# Patient Record
Sex: FEMALE
[De-identification: names, ages, dates, MRNs, and addresses within clinical notes are randomized per-mention and may not be internally consistent; named-entity substitution may affect disease eponyms.]

---

## 2020-06-05 NOTE — XMS REPORT
Summary of Care: 16 - 16

                             Created on: 2043



LUZMARIA HARDY

External Reference #: [id number]

: 1990

Sex: Female



Demographics





                          Address                   [address]

[place]  [address]-

 

                          Home Phone                [phone]

 

                          Preferred Language        English

 

                          Marital Status            Single

 

                          Spiritism Affiliation     None

 

                          Race                      Other

 

                          Ethnic Group              /Latin





Author





                          Author                    [facility]

ospital

 

                          Organization              [facility]

ospi\[facility] [place] [facility]\""

 

                          Address                   Unknown

 

                          Phone                     Unavailable







Encounter





MELISSA Rizzo(EDGARDO) [id number] Date(s): 16 - 16

[facility] [address] [place] [address]-     [phone]
[phone]

Discharge Diagnosis: Laceration of chin

Discharge Diagnosis: Scalp laceration

Discharge Diagnosis: Closed head injury

Discharge Disposition: Home or Self Care

Attending Physician: Margaret Lott DO





Vital Signs





  



                     Most recent to      1                   2



                                         oldest [Reference  



                                         Range]:  

 

  



                           Height                    165.1 cm 



                                         (16 3:37 AM) 

 

  



                     Temperature Oral    98.1 DegF           98.4 DegF



                     [96.4-99.1 DegF]    (16 6:30 AM)  (16 3:37 AM)

 

  



                     Blood Pressure      117/80 mmHg         115/85 mmHg



                     [/60-90 mmHg]  (16 6:30 AM)  (16 3:37 AM)

 

  



                     Respiratory Rate    20 BRMIN            24 BRMIN



                     [14-20 BRMIN]       (16 6:30 AM)  *HI*



                                         (16 3:37 AM)

 

  



                     Peripheral Pulse    99 bpm              132 bpm



                     Rate [ bpm]   (16 6:30 AM)  *HI*



                                         (16 3:37 AM)

 

  



                           Weight                    54.545 kg 



                                         (16 3:37 AM) 

 

  



                           Body Mass Index           20.01 m2 



                                         (16 3:37 AM) 







Problem List





No data available for this section



Allergies, Adverse Reactions, Alerts





   



                 Substance       Reaction        Severity        Status

 

   



                           NKDA                      Active







Medications





ketOROLAC

30 mg, 1 mL, Route: IVP, Drug form: INJ, ONCE, Dosing Weight 54.545, kg, Priorit
y: STAT, Start date: 16 5:23:00 CST, Stop date: 16 5:23:00 CST

Notes: (Same as:Toradol) IV bolus must be given >15 seconds. Give IM 
administration slowly and deeply into the muscle.Not for use > 4 days  *** 
MEDICATION WASTE ***Product Size:  30 mgProduct Wasted:  ___ mg

Start Date: 16

Stop Date: 16

Status: Completed



morphine Sulfate

4 mg, 1 mL, Route: IVP, Drug form: SOLN, ONCE, Dosing Weight 54.545, kg, Priorit
y: STAT, Start date: 16 5:24:00 CST, Stop date: 16 5:24:00 CST

Notes: (Same as:MORPhine Sulfate)

Start Date: 16

Stop Date: 16

Status: Completed



ondansetron

4 mg, 2 mL, Route: IVP, Drug form: INJ, ONCE, Dosing Weight 54.545, kg, Priority
: STAT, Start date: 16 5:24:00 CST, Stop date: 16 5:24:00 CST

Notes: (Same as: Zofran)  *** MEDICATION WASTE ***Product Size:  4 mgProduct Was
milka:  ___ mg

Start Date: 16

Stop Date: 16

Status: Completed



Sodium Chloride 0.9% (Bolus) IV

1,000 mL, 2,000 ml/hr, Infuse Over: 30 minutes, Route: IV, 1,000, Drug form: INJ
, ONCE, Priority: STAT, Dosing Weight 54.545 kg, Start date: 16 3:49:00 CS
T, Duration: 1 doses or times, Stop date: 16 3:49:00 CST

Start Date: 16

Stop Date: 16

Status: Completed



Results





ELECTROLYTES



 



                           Most recent to            1



                                         oldest [Reference 



                                         Range]: 

 

 



                           Sodium Lvl [135-145       143 mEq/L



                           mEq/L]                    (16 3:56 AM)

 

 



                           Potassium Lvl             3.1 mEq/L



                           [3.5-5.1 mEq/L]           *LOW*



                                         (16 3:56 AM)

 

 



                           Chloride Lvl [      107 mEq/L



                           mEq/L]                    (16 3:56 AM)

 

 



                           CO2 [24-32 mEq/L]         26 mEq/L



                                         (16 3:56 AM)

 

 



                           AGAP [10.0-20.0           13.1 mEq/L



                           mEq/L]                    (16 3:56 AM)







CHEM PANEL



 



                           Most recent to            1



                                         oldest [Reference 



                                         Range]: 

 

 



                           Creatinine Lvl            0.72 mg/dL



                           [0.50-1.40 mg/dL]         (16 3:56 AM)

 

 



                           eGFR                      118 mL/min/1.73m2 1



                                         *NA*



                                         (16 3:56 AM)

 

 



                           BUN [7-22 mg/dL]          4 mg/dL



                                         *LOW*



                                         (16 3:56 AM)

 

 



                           B/C Ratio [6-25]          6



                                         (16 3:56 AM)

 

 



                           Glucose Lvl [70-99        113 mg/dL



                           mg/dL]                    *HI*



                                         (16 3:56 AM)

 

 



                           Total Protein             7.4 g/dL



                           [6.4-8.4 g/dL]            (16 3:56 AM)

 

 



                           Albumin Lvl [3.5-5.0      4.2 g/dL



                           g/dL]                     (16 3:56 AM)

 

 



                           Globulin [2.7-4.2         3.2 g/dL



                           g/dL]                     (16 3:56 AM)

 

 



                           A/G Ratio [0.7-1.6]       1.3



                                         (16 3:56 AM)

 

 



                           Calcium Lvl               8.0 mg/dL



                           [8.5-10.5 mg/dL]          *LOW*



                                         (16 3:56 AM)

 

 



                           ALT [0-65 unit/L]         30 unit/L



                                         (16 3:56 AM)

 

 



                           AST [0-37 unit/L]         21 unit/L



                                         (16 3:56 AM)

 

 



                           Alk Phos [          71 unit/L



                           unit/L]                   (16 3:56 AM)

 

 



                           Bili Total [0.2-1.3       0.5 mg/dL



                           mg/dL]                    (16 3:56 AM)







1Result Comment: The eGFR is calculated using the CKD-EPI formula. In most 
young, healthy individuals the eGFR will be >90 mL/min/1.73m2. The eGFR declines
with age. An eGFR of 60-89 may be normal in some populations, particularly the 
elderly, for whom the CKD-EPI formula has not been extensively validated. Use of
the eGFR is not recommended in the following populations:



Individuals with unstable creatinine concentrations, including pregnant patients
and those with serious co-morbid conditions.



Patients with extremes in muscle mass or diet. 



The data above are obtained from the National Kid[employer] Disease Education Program (
NKDEP) which additionally recommends that when the eGFR is used in patients with
extremes of body mass index for purposes of drug dosing, the eGFR should be mul
tiplied by the estimated BMI.



TOXICOLOGY



 



                           Most recent to            1



                                         oldest [Reference 



                                         Range]: 

 

 



                           Etoh (%)                  .118 %



                                         *NA*



                                         (16 3:56 AM)

 

 



                           Ethanol Lvl               118 mg/dL



                                         *NA*



                                         (16 3:56 AM)







ENDOCRINOLOGY



 



                           Most recent to            1



                                         oldest [Reference 



                                         Range]: 

 

 



                           hCG Tot                   <1 mIU/mL



                                         *NA*



                                         (16 3:56 AM)







HEMATOLOGY



 



                           Most recent to            1



                                         oldest [Reference 



                                         Range]: 

 

 



                           WBC [3.7-10.4 K/CMM]      9.6 K/CMM



                                         (16 3:56 AM)

 

 



                           RBC [4.20-5.40            4.49 M/CMM



                           M/CMM]                    (16 3:56 AM)

 

 



                           Hgb [12.0-16.0 g/dL]      14.5 g/dL



                                         (16 3:56 AM)

 

 



                           Hct [36.0-48.0 %]         41.7 %



                                         (16 3:56 AM)

 

 



                           MCV [80.0-98.0 fL]        92.9 fL



                                         (16 3:56 AM)

 

 



                           MCH [27.0-31.0 pg]        32.2 pg



                                         *HI*



                                         (16 3:56 AM)

 

 



                           MCHC [32.0-36.0           34.7 g/dL



                           g/dL]                     (16 3:56 AM)

 

 



                           RDW [11.5-14.5 %]         12.9 %



                                         (16 3:56 AM)

 

 



                           Platelet [133-450         273 K/CMM



                           K/CMM]                    (16 3:56 AM)

 

 



                           MPV [7.4-10.4 fL]         8.4 fL



                                         (16 3:56 AM)

 

 



                           Segs [45.0-75.0 %]        79.0 %



                                         *HI*



                                         (16 3:56 AM)

 

 



                           Lymphocytes               13.5 %



                           [20.0-40.0 %]             *LOW*



                                         (16 3:56 AM)

 

 



                           Monocytes [2.0-12.0       7.3 %



                           %]                        (16 3:56 AM)

 

 



                           Basophils [0.0-1.0        0.2 %



                           %]                        (16 3:56 AM)

 

 



                           Segs-Bands #              7.6 K/CMM



                           [1.5-8.1 K/CMM]           (16 3:56 AM)

 

 



                           Lymphocytes #             1.3 K/CMM



                           [1.0-5.5 K/CMM]           (16 3:56 AM)

 

 



                           Monocytes # [0.0-0.8      0.7 K/CMM



                           K/CMM]                    (16 3:56 AM)

 

 



                           PT [12.0-14.7             13.3 seconds



                           seconds]                  (16 3:56 AM)

 

 



                           INR [0.85-1.17]           0.99



                                         (16 3:56 AM)

 

 



                           PTT [22.9-35.8            28.1 seconds



                           seconds]                  (16 3:56 AM)







Immunizations





No data available for this section



Procedures





   



                 Procedure       Date            Related Diagnosis  Body Site

 

   



                                          section   







Social History





 



                           Social History Type       Response

 

 



                           Smoking Status            Current some day smoker; Ex

posure to Tobacco Smoke None; 

Cigarette Smoking



                                         Last 365 Days Yes; Reg Smoking Cessatio

n Counseling Yes







Assessment and Plan





No data available for this section

## 2020-06-05 NOTE — XMS REPORT
Continuity of Care Document

                             Created on: 2020



LUZMARIA HARDY

External Reference #: [id number]

: [id number]

Sex: Female



Demographics





                          Address                   [address] [place]  [address]

 

                          Home Phone                [phone]

 

                          Preferred Language        English

 

                          Marital Status            Unknown

 

                          Jain Affiliation     Unknown

 

                          Race                      Unknown

 

                                        Additional Race(s) 

Other



Other



 

                          Ethnic Group              /Latin





Author





                          Author                    [facility]

t

 

                          Organization              [facility]

 

                          Address                   [address]

[place]  [address]



 

                          Phone                     Unavailable







Support





                Name            Relationship    Address         Phone

 

                    [address]  [address]                [address] [place]  [address]                      [phone]







Care Team Providers





                    Care Team Member Name Role                Phone

 

                    Jessica CARDENAS, ENRIQUE Avery    PCP                 [phone]

 

                    Porsha Anderson Attphys             [phone] 

 

                    Celso White  Attphys             [phone]

 

                    Saul CARDENAS, RAQUEL Goodwin [facility]             [phone]

 

                    Jessica CARDENAS, ENRIQUE Avery    Attphys             [phone]

 

                    Pedro Lott-Fabien Randall [facility]             [phone]

 

                    Ariel Lott Cat    Attphys             [phone]

 

                    Kal Tafoya Att[facility]             [phone]







Payers





           Payer Name Policy Type Policy Number Effective Date Expiration Date S ource [employer] [facility]

 

[id number] [facility] [id number] 
[address]                xxxxxxxxx    2018 00:00:00              James irwin







Problems





           Condition Name Condition Details Condition Category Status     Onset 

Date Resolution

Date            Last Treatment Date Treating Clinician Comments        Source

 

                          D27.0=BENIGN NEOPLASM OF RIGHT OVARY/N92              

           D27.0=BENIGN 

NEOPLASM OF RIGHT OVARY/N92                        Active                       
2019                                                                      
                          [facility]                     Diagnosis           Ac

tive              

2019 00:00:00              2019 12:30:00                           M

ras Munoz

 

                RAMOS (generalized anxiety disorder) RAMOS (generalized anxiety diso

rder) Disease         

Active     2019 00:00:00                                             [facility]

 

                          THROAT PAIN/COUGHING                              THRO

AT PAIN/COUGHING               

        Active                        2017                                
                                                                [facility]     
               Diagnosis Active  2017 00:00:00         2017 16:14:00

                 

[facility]

 

                          ASSAULT                                           ASSA

ULT                        Active           

            2016                                                          
                                      [facility]                     Diagnosis 

                

Active     2016 00:00:00            2016 04:07:00                   

    [facility]

 

                          ARM PAIN                                          ARM 

PAIN                        Active         

              10/25/2016                                                        
                                        [facility]                     

Diagnosis Active    2016-10-25 00:00:00           2016-11-15 07:58:00           

          [facility]

 

                          Acute upper respiratory infection, unspecified        

                 Acute 

upper respiratory infection, unspecified                                        
       2017     
                                           [facility]                     

Problem             2017 05:00:00 2017 04:47:25 2017 04:47:25 

                    

[facility]

 

                          Pneumonia, unspecified organism                       

  Pneumonia, unspecified 

organism                                                2017                                     
           [facility]                     Problem                          05:00:00 

2017 04:47:25 2017 04:47:25                                 [facility]

 

                          Discharge Diagnosis: Laceration of chin               

          Discharge 

Diagnosis: Laceration of chin                                                
2016            
                                    [facility]                     Problem     

                            

2016 06:00:00 2017 04:11:17 2017 04:11:17                     

      [facility]

 

                          Discharge Diagnosis: Scalp laceration                 

        Discharge 

Diagnosis: Scalp laceration                                                
2016            
                                   [facility]                     Problem     

                            

2016 06:00:00 2017 04:11:17 2017 04:11:17                     

      [facility]

 

                          Discharge Diagnosis: Closed head injury               

          Discharge 

Diagnosis: Closed head injury                                                
2016            
                                    [facility]                     Problem     

                            

2016 06:00:00 2017 04:11:17 2017 04:11:17                     

      [facility]

 

                          Discharge Diagnosis: Contusion of hand                

         Discharge 

Diagnosis: Contusion of hand                                                
10/25/2016                                                10/28/2016            
                                    [facility]                     Problem     

                            

2016-10-25 05:00:00 2016-10-28 04:08:39 2016-10-28 04:08:39                     

      [facility]







Allergies, Adverse Reactions, Alerts





        Allergy Name Allergy Type Status  Severity Reaction(s) Onset Date Inacti

ve Date 

Treating Clinician        Comments                  Source

 

       No Known Medication Allergies No Known Medication Allergies Active       

                                    

[facility]







Family History





           Family Member Diagnosis  Comments   Start Date Stop Date  Source

 

           Maternal grandmother Arthritis                                   [facility]

[facility]

 

           Maternal grandmother Cancer                                      [place]

is [facility]

 

           Natural [facility] Arthritis                                   [facility]

 

           Natural mother Psychiatry                                  [facility]







Social History





           Social Habit Start Date Stop Date  Quantity   Comments   Source

 

           History SDOH Alcohol Std Drinks                                      

       [facility] SDOH Alcohol Binge                                           

  [facility]

 

           Sex Assigned At Birth                                             [facility]

 

           Alcohol intake 2020 00:00:00 2020 00:00:00               

        [facility] SDOH Alcohol Frequency 2019 00:00:00 2019 00:00:0

0 1                     

[facility]







                Smoking Status  Start Date      Stop Date       Source

 

                Former smoker   2020 00:00:00 2020 00:00:00 [place] [facility]

ealt

 

                Social History  2017 21:47:08                 [facility]







Medications





             Ordered Medication Name Filled Medication Name Start Date   Stop Da

te    Current 

Medication? Ordering Clinician Indication Dosage     Frequency  Signature (SIG) 

Comments                  Components                Source

 

           FLUoxetine (PROZAC) 10 mg capsule            2020 00:00:00 2020 23:59:00 No          

                Generalized anxiety disorder                                 Noe

e 1 capsule by mouth daily for 7 days, THEN 

2 capsules daily for 30 days.                                         [facility]

 

       Omnipaque 300        2019 18:37:00        Yes                      

          10 mL, Route: Intrauteral, 

Dosing Weight 54.545, kg, ONCE, Start date: 19 12:37:00 CST, Stop date: 
19 12:37:00 [facility]

 

          FLUoxetine (PROZAC) 20 mg capsule           2019-10-30 00:00:00       

    Yes                 RAMOS (generalized

anxiety disorder) 40mg       QD         Take 2 capsules by mouth daily.         

              [facility]

 

          propranolol (INDERAL) 10 mg tablet           2019-10-30 00:00:00      

     Yes                 RAMOS 

(generalized anxiety disorder)                                         Take 1-2 

tablets by mouth up to three times 

daily as needed for anxiety.                                         [facility]

th

 

          cyclobenzaprine (FLEXERIL) 10 mg tablet           2019 00:00:00 

          Yes                 Chronic 

right-sided low back pain with right-sided sciatica 10mg                        

            Take 1 tablet by 

mouth 3 times daily as needed for Muscle Spasms.                                

         [facility]

 

           FLUoxetine (PROZAC) 20 mg capsule            2019-07-10 00:00:00 2019

-10-30 00:00:00 No          

          RAMOS (generalized anxiety disorder) 20mg      QD        Take 1 capsule 

by mouth daily.                     

[facility]

 

           propranolol (INDERAL) 10 mg tablet            2019-07-10 00:00:00 201

9-10-30 00:00:00 No         

                RAMOS (generalized anxiety disorder) 10mg                         

   Take 1 tablet by mouth 3 times daily 

as needed (anxiety).                                         [facility]

 

          naproxen (NAPROSYN) 500 mg tablet           2019 00:00:00       

    Yes                 Chronic low back

pain with bilateral sciatica, unspecified back pain laterality 500mg            

                       Take 1 

tablet by mouth 2 times daily as needed for Pain.                               

          [facility]

 

          gabapentin (NEURONTIN) 300 mg capsule           2019 00:00:00   

        Yes                 Chronic low 

back pain with bilateral sciatica, unspecified back pain laterality             

                            Take 1 

capsule daily for 1 week. You may increase the dose to 1 capsule twice daily if 
needed..                                                    [facility]

 

          busPIRone (BUSPAR) 5 mg tablet           2019 00:00:00 2019

2 00:00:00 No                  

RAMOS (generalized anxiety disorder) 5mg                 Q.5D                Take 

1 tablet by mouth 2 times 

daily.                                                      [facility]

 

       azithromycin (ZITHROMAX) 250 mg tablet        2019 00:00:00        

Yes                  250mg         

250 mg.                                                     [facility]

 

       predniSONE (DELTASONE) 10 mg tablet        2019 00:00:00        Yes

                  10mg          10 

mg.                                                         [facility]

 

                    promethazine/dextromethorphan (PROMETHAZINE-DM) 6.25-15 mg/5

 mL syrup                     

2019 00:00:00        Yes                                                  

   [facility]

 

           hydrOXYzine (ATARAX) 25 mg tablet            2019 00:00:00 2019 00:00:00 No          

                Anxiety disorder, unspecified type 25mg                         

   Take 1 tablet by mouth 2 times daily 

as needed for Anxiety or Insomnia.                                         [facility]

 

       escitalopram (LEXAPRO) 10 mg tablet        2019 00:00:00        Yes

                  10mg          10 

mg.                                                         [facility]

 

       benzonatate 200 MG Oral Capsule [Tessalon]        2017 21:52:00    

    Yes                                

200 mg = 1 cap, PO, TID, X 7 day, # 21 cap, 0 Refill(s)                         

                [facility]

 

       amoxicillin 500 mg oral tablet        2017 21:52:00        Yes     

                           500 mg = 1 

tab, PO, BID, X 10 day, # 20 tab, 0 Refill(s)                                   

      [facility]

 

      Morphine       2016 11:24:00       No                            Not

es: (Same as:MORPhine Sulfate)       

                                        [facility]

 

       Ondansetron        2016 11:24:00        No                         

        Notes: (Same as: Zofran)   *** 

MEDICATION WASTE *** Product Size:  4 mg Product Wasted:  ___ mg                

                         [facility]

 

       Ketorolac        2016 11:23:00        No                           

       4 days   *** MEDICATION WASTE *** 

Product Size:  30 mg Product Wasted:  ___ mg                                    

     [facility]

 

        Sodium Chloride 0.154 MEQ/ML Injectable Solution         2016 09:4

9:00         No                      

                                                    1,000 mL, 2,000 ml/hr, Infus

e Over: 30 minutes, Route: IV, 1,000, Drug form:

 INJ, ONCE, Priority: STAT, Dosing Weight 54.545 kg, Start date: 16 
3:49:00 CST, Duration: 1 doses or times, Stop date: 16 3:49:00 CST        

                                 

[facility]

 

          tramadol hydrochloride 50 MG Oral Tablet [Ultram]           2016-10-26

 02:35:00           Yes                 

                                 50 mg = 1 tab, PO, Q4H, PRN pain, X 5 day, # 30

 tab, 0 Refill(s)                       

[facility]







Vital Signs





             Vital Name   Observation Time Observation Value Comments     Source

 

             Systolic blood pressure 2020 08:13:00 122 mm[Hg]             

   [facility]

 

             Diastolic blood pressure 2020 08:13:00 81 mm[Hg]             

    [facility]

 

             Heart rate   2020 08:13:00 85 /min                   [facility]

 

             Body temperature 2020 08:13:00 36.72 Heidi                 [place]

is [facility]

 

             Respiratory rate 2020 08:13:00 20 /min                   [place]

is [facility]

 

             Body height  2020 08:13:00 154.9 cm                  [facility]

 

             Body weight  2020 08:13:00 62.778 kg                 [facility]

 

             BMI          2020 08:13:00 26.15 kg/m2               [facility]

 

                    Oxygen saturation in Arterial blood by Pulse oximetry 2019 09:08:00 98 

/min                                                [facility]

 

             Heart Rate   2017 22:31:00                           [facility]

 

             Systolic (mm Hg) 2017 22:31:00                           [facility]

 

             Diastolic (mm Hg) 2017 22:31:00                           [facility]

[facility]

 

             Temperature Oral (F) 2017 22:31:00 98 F                      

[facility]

 

             Respitory Rate 2017 22:31:00                           [facility]

 

             Weight       2017 20:32:00                           [facility]

 

             Height       2017 20:32:00 152.4 cm                  [facility]

 

             BMI Calculated 2017 20:32:00                           [facility]

 

             Systolic (mm Hg) 2017 20:32:00                           [facility]

 

             Diastolic (mm Hg) 2017 20:32:00                           [facility]

 

             Heart Rate   2017 20:32:00                           [facility]

 

             Respitory Rate 2017 20:32:00                           [facility]

 

             Temperature Oral (F) 2017 20:32:00 97.9 F                    

[facility]

 

             Respitory Rate 2016 12:30:00                           [facility]

 

             Systolic (mm Hg) 2016 12:30:00                           [facility]

[facility]

 

             Diastolic (mm Hg) 2016 12:30:00                           [facility]

[facility]

 

             Heart Rate   2016 12:30:00                           [facility]

 

             Temperature Oral (F) 2016 12:30:00 98.1 F                    

[facility]

 

             Systolic (mm Hg) 2016 09:37:00                           [facility]

[facility]

 

             Diastolic (mm Hg) 2016 09:37:00                           [facility]

[facility]

 

             Heart Rate   2016 09:37:00                           [facility]

 

             Respitory Rate 2016 09:37:00                           [facility]

[facility]

 

             BMI Calculated 2016 09:37:00                           [facility]

 

             Weight       2016 09:37:00                           [facility]

 

             Height       2016 09:37:00 165.1 cm                  [facility]

 

             Temperature Oral (F) 2016 09:37:00 98.4 F                    

[facility]

 

             Respitory Rate 2016-10-26 02:59:00                           [facility]

 

             Systolic (mm Hg) 2016-10-26 02:59:00                           [facility]

 

             Diastolic (mm Hg) 2016-10-26 02:59:00                           [facility]

[facility]

 

             Heart Rate   2016-10-26 02:59:00                           [facility]

 

             Temperature Oral (F) 2016-10-26 02:59:00 97.6 F                    

[facility]

 

             Weight       2016-10-26 00:48:00                           [facility]

 

             BMI Calculated 2016-10-26 00:48:00                           [facility]

 

             Systolic (mm Hg) 2016-10-26 00:48:00                           [facility]

[facility]

 

             Diastolic (mm Hg) 2016-10-26 00:48:00                           [facility]

[facility]

 

             Respitory Rate 2016-10-26 00:48:00                           [facility]

[facility]

 

             Heart Rate   2016-10-26 00:48:00                           [facility]

 

             Temperature Oral (F) 2016-10-26 00:48:00 97.9 F                    

[facility]

 

             Height       2016-10-26 00:48:00 152.4 cm                  [facility]







Procedures





                Procedure       Date / Time Performed Performing Clinician [facility]

e

 

                HEPATITIS PANEL 2019 21:00:00 Bennie Lopez

h

 

                 section                                 [facility] Isaias chery







Plan of Care





             Planned Activity Planned Date Details      Comments     Source

 

                    Future Scheduled Test 2020-10-01 00:00:00 IMM Influenza Seas

onal Oct to March 

(>/= 19 yrs) [code = IMM Influenza Seasonal Oct to March (>/= 19 yrs)]          

                 [facility]

 

                    Future Scheduled Test 2013 00:00:00 Cervical Cancer Sc

rn (3 Yrs) [code = 

Cervical Cancer Scrn (3 Yrs)]                           [facility]







Encounters





             Start Date/Time End Date/Time [facility]   [facility] Department Encounter ID    Source

 

        2020 00:00:00 2020 00:00:00 Outpatient                 [facility]     [id number] [facility]

 

        2020 08:11:49 2020 08:11:49 Outpatient                 [facility]     [id number] [facility]

 

          2019 12:20:00 2019 23:59:00 Outpatient           [facility]      

[facility]                      [id number]               

 

        2019 12:20:00 2019 12:20:00 Outpatient                 [facility] 

   [facility]    [id number]    [facility]

 

        2019 00:00:00 2019 00:00:00 Outpatient                 [facility]     [id number] [facility]

 

        2019-10-31 00:00:00 2019-10-31 00:00:00 Outpatient                 [facility]     [id number] [facility]

 

        2019-10-30 09:09:35 2019-10-30 09:09:35 Outpatient                 [facility]     [id number] [facility]

 

        2019 00:00:00 2019 00:00:00 Outpatient                 [facility]     [id number] [facility]

 

        2019 00:00:00 2019 00:00:00 Outpatient                 [facility]     [id number] [facility]

 

        2019 15:57:08 2019 15:57:08 Outpatient                 [facility]     [id number] [facility]

 

        2019 15:33:11 2019 15:33:11 Outpatient                 [facility]     [id number] [facility]

 

        2019 00:00:00 2019 00:00:00 Outpatient                 [facility]     [id number] [facility]

 

        2019-07-10 09:19:29 2019-07-10 09:19:29 Outpatient                 [facility]     [id number] [facility]

 

        2019 00:00:00 2019 00:00:00 Outpatient                 [facility]     [id number] [facility]

 

        2019 00:00:00 2019 00:00:00 Outpatient                 [facility]     [id number] [facility]

 

        2019 09:38:23 2019 09:38:23 Outpatient                 [facility]     [id number] [facility]

 

        2019 09:20:47 2019 09:20:47 Outpatient                 [facility]     [id number] [facility]

 

        2019 09:17:20 2019 09:17:20 Outpatient                 [facility]     [id number] [facility]

 

        2019 00:00:00 2019 00:00:00 [facility]                 [facility] [facility]     [id number] [facility]

 

        2019 00:00:00 2019 00:00:00 Outpatient                 [facility]     [id number] [facility]

 

        2019 00:00:00 2019 00:00:00 Outpatient                 [facility]     [id number] [facility]

 

        2019 13:53:39 2019 13:53:39 Outpatient                 [facility]     [id number] [facility]

 

        2019 11:32:41 2019 11:32:41 Outpatient                 [facility]     [id number] [facility]

 

        2019 09:36:24 2019 09:36:24 Outpatient                 [facility]     [id number] [facility]

 

           2017 15:28:00 2017 17:33:00 Outpatient            Randall Lott [facility]am 

[facility]                [facility]                [id number]         

 

        2016 03:35:00 2016 06:34:00 Outpatient         Margaret Lott [facility]    [facility]    

[id number]                             

 

          2016-10-25 19:35:00 2016-10-25 22:08:00 Outpatient           Darlene Tafoya [facility]      

[facility]                      [id number]               







Results





           Test Description Test Time  Test Comments Results    Result Comments 

Source

 

           [facility] 2019 18:59:00            <1                    Me

[facility]

 

                    Hepatitis Panel     2019 11:55:00   

 

                                        Test Item

 

             Hep C Vir Ab IgG (test code = 92611-5) Negative     Negative       

            

 

             Hep B Surface Ag (test code = 5196-1) Negative     Negative        

           

 

             Hep A Vir Ab IgM (test code = 71123-6) Negative     Negative       

            

 

             Hep B Core Ab IgM (test code = 24113-3) Negative     Negative      

             

 

             Lab Interpretation (test code = 94851-1) Normal                    

              





[facility] [id number] 09:56:82045Nhizjebp HermannCHEM PANEL
2016 09:56:510.5[facility]annCHEM MHJKJ5149-33-99 09:56:517.4[facility] 
HermannCHEM VUUSU4769-08-15 09:56:5130[facility]annCHEM RQNCR8932-23-87 
09:56:5171[facility]annCHEM JASOS5285-76-54 09:56:514.2[facility]annCHEM 
UQXUU9849-33-07 09:56:5121[facility]annCHEM TDEIK8986-96-86 09:56:514[facility]
HermannCHEM KPDKO4175-08-00 09:56:510.72[facility]annCHEM IADBS5132-99-67 
09:56:30203Xrkolisw HermannCHEM LTQGX1615-30-73 09:56:513.1[facility] HermannCHEM 
FQEIE0360-33-61 09:56:5126[facility] HermannCHEM RRELM1430-13-62 09:56:59961
[facility]annCHEM WNEVQ3920-77-11 09:56:518.0[facility] HermannCHEM PANEL
2016 09:56:00933Xekaarwn HermannCHEM IOSGN3716-93-51 09:56:516[facility] 
HermannCHEM DSJEA2411-62-11 09:56:511.3[facility] HermannCHEM MCJQY6942-50-61 
09:56:5113.1[facility] HermannCHEM QESKX8313-11-80 09:56:513.2[facility]
NTLWWIIVWVYLC0348-56-98 09:56:51<1[facility] UhfulkyBNLREJMSNO3437-19-22 09:56:51
8.4[facility] GsgtrdeHXWBJCDOQC1515-89-73 09:56:00798Knkpwcxh HermannHEMATOLOGY
2016 09:56:5141.7[facility] LrnmfoqUZUKPGJVZT3277-32-71 09:56:5114.5[facility]
OacgahgAJVMXJVGEJ8350-34-90 09:56:5192.9[facility] NiyexksJTZGEXOTNH1922-19-91 
09:56:51* 



             Test Item    Value        Reference Range Interpretation Comments

 

             MCH (test code = MCH) 32.2 pg      27.0-31.0                  





[facility] JulyaokFUWGEQVTCO1776-94-68 09:56:5112.9[facility] HermannHEMATOLOGY
2016 09:56:5134.7[facility] YupygjiCSQLSIJVGW5325-78-07 09:56:514.49[facility]
TtvxrnaDLNPGPCKYW4011-71-03 09:56:519.6[facility] NbwpyedCWRHPRNTCM8932-12-25 
09:56:510.99[facility] JkoneguEVCVOVVDTC1237-54-39 09:56:51* 



             Test Item    Value        Reference Range Interpretation Comments

 

             PT (test code = PT) 13.3 s       12.0-14.7                  





[facility] DvqowzhZFUKXAAMJD3425-43-33 09:56:51* 



             Test Item    Value        Reference Range Interpretation Comments

 

             PTT (test code = PTT) 28.1 s       22.9-35.8                  





[facility] BjxawntVPAJRJEZHK4758-19-67 09:56:510.7[facility] HermannHEMATOLOGY
2016 09:56:5179.0[facility] DwtowrpINODREUIOG5203-09-39 09:56:5113.5[facility]
VejuybkDCWNNHMTUO2377-18-24 09:56:517.6[facility] RtvknqfGOFYKQASHX8282-91-94 
09:56:517.3[facility] TetsiprRNVLHCUCSF2007-67-07 09:56:511.3[facility]
WYUYXZRGQT3105-55-13 09:56:510.2[facility] JznuovvIBKDVCEERT8092-31-58 09:56:31286
[facility] CgfgfpySSPCXGANAQ7635-43-70 09:56:510.118[facility]

## 2020-06-05 NOTE — NUR
-------------------------------------------------------------------------------

            *** Note undone in [facility] - 20 at 1320 by MILADIS ***             

-------------------------------------------------------------------------------

[place]  [facility] called with 30 min eta

## 2020-06-05 NOTE — EMERGENCY DEPARTMENT NOTE
History of Present Illnes


History of Present Illness


Chief Complaint:  Abdominal Complaints


History of Present Illness


This is a 30 year old  female arrives to the ED with abdominal pain for 

several days, pain is worsening associate nausea and vomiting.


Historian:  Patient


Arrival Mode:  Car


 Required:  No


Onset (how long ago):  day(s)


Radiation:  back


Severity:  moderate


Onset quality:  gradual


Duration (how long):  day(s)


Timing of current episode:  constant


Progression:  worsening


Relieving factors:  none


Exacerbating factors:  eating





Past Medical/Family History


Physician Review


I have reviewed the patient's past medical and family history.  Any updates have

been documented here.





Past Medical History


Recent Fever:  Yes


Clinical Suspicion of Infectio:  Yes


New/Unexplained Change in Ment:  No


Past Medical History:  None


Other Medical History:  


PT REPORTS OVERDOSE 


Past Surgical History:  


Other Surgery:  


C SECTION





Social History


Smoking Cessation:  Never Smoker


Counseling Performed:  No


Alcohol Use:  None


Any Illegal Drug Use:  No


TB Exposure/Symptoms:  No


Physically hurt or threatened:  No





Family History


Family history of heart diseas:  No





Other


Last Tetanus:  >5 YEARS


Any Pre-Existing Lines (PICC,:  No


Is patient up to date on immun:  Yes


Last Flu:  OOD


Last Pneumovax:  NA





Review of Systems


Review of Systems


Constitutional:  no symptoms


EENTM:  no symptoms


Cardiovascular:  no symptoms


Respiratory:  no symptoms


Gastrointestinal:  abdominal pain, nausea, vomiting


Genitourinary:  no symptoms


Musculoskeletal:  no symptoms


Neurological:  no symptoms


Psychological:  no symptoms


Endocrine:  no symptoms


Hematological/Lymphatic:  no symptoms


Review of other systems


All other systems reviewed and negative.





Physical Exam


Related Data


Allergies:  


Coded Allergies:  


     No Known Allergies (Unverified , 11/15/11)


Triage Vital Signs





Vital Signs








  Date Time  Temp Pulse Resp B/P (MAP) Pulse Ox O2 Delivery O2 Flow Rate FiO2


 


620 10:11 99.0 120 20 155/95 100   








Vital signs reviewed:  Yes





Physical Exam


CONSTITUTIONAL





Constitutional:  well-developed, well-nourished


HENT


HENT:  normocephalic, atraumatic, oropharynx clear/moist, nose normal


HENT L/R:  left ext ear normal, right ext ear normal


EYES





Eyes:  PERRL, conjunctivae normal


NECK


Neck:  ROM normal


PULMONARY


Pulmonary:  effort normal, breath sounds normal


CARDIOVASCULAR





Cardiovascular:  regular rhythm, heart sounds normal, capillary refill normal, 

normal rate


GASTROINTESTINAL





Abdominal:  soft, bowel sounds normal, tender


GENITOURINARY





Genitourinary:  exam deferred


SKIN


Skin:  warm, dry


MUSCULOSKELETAL





Musculoskeletal:  ROM normal


NEUROLOGICAL





Neurological:  alert, oriented x 3, no gross motor or sensory deficits


PSYCHOLOGICAL


Psychological:  mood/affect normal, judgement normal





Results


Laboratory


Result Diagram:  


20 1015





Laboratory





Laboratory Tests








Test


 20


10:15


 


White Blood Count


 10.52 x10e3/uL


(4.8-10.8)


 


Red Blood Count


 4.86 x10e6/uL


(3.6-5.1)


 


Hemoglobin


 15.1 g/dL


(12.0-16.0)


 


Hematocrit


 43.2 %


(34.2-44.1)


 


Mean Corpuscular Volume


 88.9 fL


(81-99)


 


Mean Corpuscular Hemoglobin


 31.1 pg


(28-32)


 


Mean Corpuscular Hemoglobin


Concent 35.0 g/dL


(31-35)


 


Red Cell Distribution Width


 12.2 %


(11.7-14.4)


 


Platelet Count


 317 x10e3/uL


(140-360)


 


Neutrophils (%) (Auto)


 82.2 %


(38.7-80.0)


 


Lymphocytes (%) (Auto)


 12.5 %


(18.0-39.1)


 


Monocytes (%) (Auto)


 4.7  %


(4.4-11.3)


 


Eosinophils (%) (Auto)


 0.1 %


(0.0-6.0)


 


Basophils (%) (Auto)


 0.1 %


(0.0-1.0)


 


Neutrophils # (Auto) 8.7 (2.1-6.9) 


 


Lymphocytes # (Auto) 1.3 (1.0-3.2) 


 


Monocytes # (Auto) 0.5 (0.2-0.8) 


 


Eosinophils # (Auto) 0.0 (0.0-0.4) 


 


Basophils # (Auto) 0.0 (0.0-0.1) 


 


Absolute Immature Granulocyte


(auto 0.04 x10e3/uL


(0-0.1)


 


Urine Color


 Yellow


(YELLOW)


 


Urine Clarity Clear (CLEAR) 


 


Urine pH 7.5 (5 - 7) 


 


Urine Specific Gravity


 1.020


(1.010-1.025)


 


Urine Protein


 Negative


(NEGATIVE)


 


Urine Glucose (UA)


 Negative


(NEGATIVE)


 


Urine Ketones


 Negative


(NEGATIVE)


 


Urine Blood


 Negative


(NEGATIVE)


 


Urine Nitrite


 Negative


(NEGATIVE)


 


Urine Bilirubin


 Negative


(NEGATIVE)


 


Urine Urobilinogen


 0.2 mg/dL (0.2


- 1)


 


Urine Leukocyte Esterase


 Negative


(NEGATIVE)


 


Human Chorionic Gonadotropin,


Qual Negative


(NEGATIVE)








Lab results reviewed:  Yes





Imaging


Imaging results reviewed:  Yes


Imaging Comments


Impression:


Bilateral pelvic cysts which could represent functional cysts in a woman of


childbearing age. Follow-up with pelvic ultrasound in approximately 6 weeks is


recommended.





Dense pill-like objects in the distal ileum. This could represent orally


ingested radiodense material. Clinical correlation is recommended.





Possible hepatic steatosis.





No other significant acute abnormalities seen.





Critical Care Time


Subsequent provider


I assumed direction of critical care for this patient from another provider of 

my specialty.





Assessment & Plan


Assessment & Plan


Final Impression:  


(1) UNSPECIFIED OVARIAN CYST, LEFT SIDE


(2) UNSPECIFIED OVARIAN CYST, RIGHT SIDE


Assessment & Plan


CBC, CMP, scheduled and pelvis





30-year-old female brought to the ED with complaints of abdominal pain, patient 

be tachycardic in the emergency department. Patient's pain is primarily 

epigastric and located on the right upper quadrant. There is considered as a p

ossible surgical abdomen in the form of acute cholecystitis. Patient's CT scan 

does not show any surgical process but is consistent with multiple sclerosis. 

Patient has no tenderness over her adnexal area, patient given outpatient GYN 

follow-up for further evaluation and management of her cysts including but not 

limited to chance last ultrasound and/or other GYN procedure.


Last Vital Signs











  Date Time  Temp Pulse Resp B/P (MAP) Pulse Ox O2 Delivery O2 Flow Rate FiO2


 


20 10:11 99.0 120 20 155/95 100   








Home Meds


Active Scripts


Cefdinir (OMNICEF) 300 Mg Capsule, 300 MG PO BID for 7 Days, #14


   Prov:GISELLE ALEGRIA NP         10/30/18


Reported Medications


Ondansetron (ZOFRAN ODT) 4 Mg Tab.rapdis, 4 MG SL Q6H PRN for NAUSEA, TAB


   16


Medications in the ED





Sodium Chloride 1,000 ml @  0 mls/hr Q0M STAT IV  Last administered on 20at 

10:31; Admin Dose 1,000 MLS/HR;  Start 20 at 10:25;  Stop 20 at 10:27;  

Status DC


Sodium Chloride 1,000 ml @  0 mls/hr Q0M STAT IV ;  Start 20 at 10:25;  Stop

20 at 10:27;  Status DC


Ketorolac Tromethamine 30 mg ONCE  STAT IV  Last administered on 20at 10:31;

Admin Dose 30 MG;  Start 20 at 10:25;  Stop 20 at 10:34;  Status DC


Ondansetron HCl 4 mg NOW  STAT IV  Last administered on 20at 10:32; Admin 

Dose 4 MG;  Start 20 at 10:25;  Stop 20 at 10:34;  Status DC


Sodium Chloride 1,000 ml @   STK-MED ONCE .ROUTE ;  Start 20 at 10:33;  

Stop 20 at 10:28;  Status DC











ARMANDO DE LUNA,              2020 11:09

## 2020-06-05 NOTE — XMS REPORT
Continuity of Care Document

                             Created on: 2020



LUZMARIA HARDY

External Reference #: [id number]

: 1990

Sex: Female



Demographics





                          Address                   [address] [place]  [address]

 

                          Home Phone                [phone]

 

                          Preferred Language        English

 

                          Marital Status            Unknown

 

                          Voodoo Affiliation     Unknown

 

                          Race                      Unknown

 

                          Ethnic Group              Unknown





Author





                          Author                    WEISSENHAUS

 LUZMARIA Cherry

 

                          Organization              [employer]

 

                          Address                   Unknown

 

                          Phone                     Unavailable







Care Team Providers





                    Care Team Member Name Role                Phone

 

                    Spokeable Information [facility] Unavailable         Un

available



                                    



Problems

                    



                    Problem                         Status                      

   Onset Date       

                          Classification                         Date Reported  

         

                          Comments                         Source               

     

 

                          D27.0=BENIGN NEOPLASM OF RIGHT OVARY/N92              

           Active         

                    2019                                                  

     

                                                    [facility]                

    

 

                          Acute upper respiratory infection, unspecified        

                          

                    2017                                                   [facility]       

 

           

 

                          Pneumonia, unspecified organism                       

                          

                    2017          

                                                    [facility]                

    

 

                    THROAT PAIN/COUGHING                         Active         

                

2017                                                                      

 

                                                    [facility]                

    

 

                          Discharge Diagnosis: Laceration of chin               

                          

                    2016  

                                                    [facility]                

    

 

                          Discharge Diagnosis: Scalp laceration                 

                          

                    2016    

                                                    [facility]                

    

 

                          Discharge Diagnosis: Closed head injury               

                          

                    2016  

                                                    [facility]                

    

 

                    ASSAULT                         Active                      

   2016       

                                                                                

   

                                        [facility]                    

 

                          Discharge Diagnosis: Contusion of hand                

                          

                    10/25/2016                                                  

10/28/2016   

                                                    [facility]                

    

 

                    ARM PAIN                         Active                     

    10/25/2016      

                                                                                

  

                                        [facility]                    



                                                                                
                                                                                
                                                      



Medications

                    



                    Medication                         Details                  

       Route        

                          Status                         Patient Instructions   

          

                          Ordering Provider                         Order Date  

               

                                        Source                    

 

                          Omnipaque 300                         10 mL, Route: In

trauteral, Dosing Weight 

54.545, kg, ONCE, Start date: 19 12:37:00 CST, Stop date: 19 
12:37:00 CST                                                   Inactive         

 

                                                                      2019

      

                                        [facility]                    

 

                          benzonatate 200 MG Oral Capsule [Tessalon]            

             200 mg = 1 

cap, PO, TID, X 7 day, # 21 cap, 0 Refill(s)                                    

                    Active                                                      

      

                          2017                         [facility]       

           

  

 

                          amoxicillin 500 mg oral tablet                        

 500 mg = 1 tab, PO, BID, 

X 10 day, # 20 tab, 0 Refill(s)                                                 

                    Active                                                      

                   

                          2017                         [facility]       

             

 

                          Morphine                         Notes: (Same as:MORPh

ine Sulfate)              

                                             Inactive                           

         

                                             2016                         

[facility]                    

 

                          Ondansetron                         Notes: (Same as: LORENA wilkinson)   *** MEDICATION 

WASTE *** Product Size:  4 mg Product Wasted:  ___ mg                           

                          Inactive                                              

   

                                             2016                         

[facility]       

             

 

                          Ketorolac                          4 days   *** MEDICA

TION WASTE *** Product 

Size:  30 mg Product Wasted:  ___ mg                                            

                    Inactive                                                    

              

                          2016                         [facility]       

             

 

                          Sodium Chloride 0.154 MEQ/ML Injectable Solution      

                   1,000 

mL, 2,000 ml/hr, Infuse Over: 30 minutes, Route: IV, 1,000, Drug form: INJ, 
ONCE, Priority: STAT, Dosing Weight 54.545 kg, Start date: 16 3:49:00 CST,
 Duration: 1 doses or times, Stop date: 16 3:49:00 CST                    
                                             Inactive                           

               

                                             2016                         

[facility]

                    

 

                          tramadol hydrochloride 50 MG Oral Tablet [Ultram]     

                    50 mg 

= 1 tab, PO, Q4H, PRN pain, X 5 day, # 30 tab, 0 Refill(s)                      
                                             Active                             

                 

                                             10/26/2016                         

[facility]    

                



                                                                                
                                                                                
                                    



Allergies, Adverse Reactions, Alerts

                    



                    Substance                         Category                  

       Reaction     

                          Severity                         Reaction type        

      

                    Status                         Date Reported                

         

Comments                                Source                    

 

                          No Known Medication Allergies                         

Assertion                 

                                                                      Drug aller

gy          

                                                                                

     

                                        [facility]                    



                                                        



Immunizations

        



                                        No Data Provided for This Section



                                    



Results





                    Order Name                         Results                  

       Value        

                          Reference Range                         Date          

         

                    Interpretation                         Comments             

            

Source                    

 

                ENDOCRINOLOGY                         hCG Tot         <1        

                        

                    2019                                                  

  

                                        [facility]                    

 

                CHEM PANEL                         eGFR            118          

                           

                    2016                                                  

Result 

Comment: The eGFR is calculated using the CKD-EPI formula. In most young, 
healthy individuals the eGFR will be >90 mL/min/1.73m2. The eGFR declines with 
age. An eGFR of 60-89 may be normal in some populations, particularly the 
elderly, for whom the CKD-EPI formula has not been extensively validated. Use of
 the eGFR is not recommended in the following populations:<br/><br/>Individuals 
with unstable creatinine concentrations, including pregnant patients and those 
with serious co-morbid conditions.<br/><br/>Patients with extremes in muscle 
mass or diet. <br/><br/>The data above are obtained from the National Kidney 
Disease Education Program (NKDEP) which additionally recommends that when the 
eGFR is used in patients with extremes of body mass index for purposes of drug 
dosing, the eGFR should be multiplied by the estimated BMI.                     
                                        [facility]                    

 

                CHEM PANEL                         Bili Total      0.5          

               0.2 - 

1.3                         2016                                          

                                                    [facility]                

    

 

                CHEM PANEL                         Total Protein   7.4          

               6.4

 - 8.4                         2016                                       

                                                    [facility]                

    

 

                CHEM PANEL                         ALT             30           

              0 - 65        

                    2016                                                  

   

                                        [facility]                    

 

                CHEM PANEL                         Alk Phos        71           

              39 - 136 

                          2016                                            

  

                                                     [facility]                

    

 

                CHEM PANEL                         Albumin Lvl     4.2          

               3.5 -

 5.0                         2016                                         

                                                     [facility]                

    

 

                CHEM PANEL                         AST             21           

              0 - 37        

                    2016                                                  

   

                                         [facility]                    

 

                CHEM PANEL                         BUN             4            

             7 - 22         

                    2016                                                  

    

                                         [facility]                    

 

                CHEM PANEL                         Creatinine Lvl  0.72         

                

0.50 - 1.40                         2016                                  

                                                     [facility]                

    

 

                CHEM PANEL                         Sodium Lvl      143          

               135 - 

145                         2016                                          

                                                     [facility]                

    

 

                CHEM PANEL                         Potassium Lvl   3.1          

               3.5

 - 5.1                         2016                                       

                                                     [facility]                

    

 

                CHEM PANEL                         CO2             26           

              24 - 32       

                    2016                                                  

  

                                         [facility]                    

 

                CHEM PANEL                         Chloride Lvl    107          

               95 -

 109                         2016                                         

                                                     [facility]                

    

 

                CHEM PANEL                         Calcium Lvl     8.0          

               8.5 -

 10.5                         2016                                        

                                                     [facility]                

    

 

                CHEM PANEL                         Glucose Lvl     113          

               70 - 

99                         2016                                           

                                                     [facility]                

    

 

                CHEM PANEL                         B/C Ratio       6            

             6 - 25   

                          2016                                            

    

                                                     [facility]                

    

 

                CHEM PANEL                         A/G Ratio       1.3          

               0.7 - 

1.6                         2016                                          

                                                     [facility]                

    

 

                CHEM PANEL                         AGAP            13.1         

                10.0 - 20.0

                          2016                                            

 

                                                     [facility]                

    

 

                CHEM PANEL                         Globulin        3.2          

               2.7 - 

4.2                         2016                                          

                                                     [facility]                

    

 

                ENDOCRINOLOGY                         hCG Tot         <1        

                        

                    2016                                                  

  

                                        [facility]                    

 

                HEMATOLOGY                         MPV             8.4          

               7.4 - 10.4   

                          2016                                            

    

                                                    [facility]                

    

 

                HEMATOLOGY                         Platelet        273          

               133 - 

450                         2016                                          

                                                    [facility]                

    

 

                HEMATOLOGY                         Hct             41.7         

                36.0 - 48.0 

                          2016                                            

  

                                                    [facility]                

    

 

                HEMATOLOGY                         Hgb             14.5         

                12.0 - 16.0 

                          2016                                            

  

                                                    [facility]                

    

 

                HEMATOLOGY                         MCV             92.9         

                80.0 - 98.0 

                          2016                                            

  

                                                    [facility]                

    

 

                HEMATOLOGY                         MCH             32.2         

                27.0 - 31.0 

                          2016                                            

  

                                                    [facility]                

    

 

                HEMATOLOGY                         RDW             12.9         

                11.5 - 14.5 

                          2016                                            

  

                                                    [facility]                

    

 

                HEMATOLOGY                         MCHC            34.7         

                32.0 - 36.0

                          2016                                            

 

                                                    [facility]                

    

 

                HEMATOLOGY                         RBC             4.49         

                4.20 - 5.40 

                          2016                                            

  

                                                    [facility]                

    

 

                HEMATOLOGY                         WBC             9.6          

               3.7 - 10.4   

                          2016                                            

    

                                                    [facility]                

    

 

                HEMATOLOGY                         INR             0.99         

                0.85 - 1.17 

                          2016                                            

  

                                                    [facility]                

    

 

                HEMATOLOGY                         PT              13.3         

                12.0 - 14.7  

                          2016                                            

   

                                                    [facility]                

    

 

                HEMATOLOGY                         PTT             28.1         

                22.9 - 35.8 

                          2016                                            

  

                                                    [facility]                         Monocytes #     0.7          

               0.0 -

 0.8                         2016                                         

                                                    [facility]                

    

 

                HEMATOLOGY                         Segs            79.0         

                45.0 - 75.0

                          2016                                            

 

                                                    [facility]                         Lymphocytes     13.5         

                20.0

 - 40.0                         2016                                      

                                                    [facility]                         Segs-Bands #    7.6          

               1.5 

- 8.1                         2016                                        

                                                    [facility]                         Monocytes       7.3          

               2.0 - 

12.0                         2016                                         

                                                    [facility]                         Lymphocytes #   1.3          

               1.0

 - 5.5                         2016                                       

                                                    [facility]                         Basophils       0.2          

               0.0 - 

1.0                         2016                                          

                                                    [facility]                

    

 

                TOXICOLOGY                         Ethanol Lvl     118          

                    

                    2016                                                  

                                        [facility]                    

 

                TOXICOLOGY                         Etoh (%)        0.118        

                       

                    2016                                                  

 

                                        [facility]                    



                                                                                
                                                                                
                                                                                
                                                                                
                                                                               



Pathology Reports





                                        No Data Provided for This Section       

             



                                                



Diagnostic Reports

                    



                    Report                         Value                        

 Date               

                                        Source                    

 

                          Hysterosalpingography DX                         PROCE

DURE INFORMATION: 

Exam: IR Hysterosalpingogram; Radiological supervision and Interpretation 

Exam date and time: 2019 3:03 PM 

Age: 29 years old 

Clinical history: Benign neoplasm of right ovary; Excessive and frequent 

menstruation with irregular cycle; Pelvic and perineal pain; Additional info: 

/r10.2 pelvic and perineal pain; D27.0 benign neoplasm of right ovary; N92.1 

excessive and frequent menstruation with irregular cycle 

TECHNIQUE: 

Imaging protocol: Hysterosalpingogram. Radiological supervision and 

Interpretation. The interpreting physician was present and supervised the 

procedure. 

Other technique: Dose: Reference Air Kerma: 2,157 mGy / 537.9 uGym2;  seconds= 

                                        45;  images= 14;

COMPARISON: 

None. 

FINDINGS: 

 Uterus: Fills normally. No evidence of contour abnormality, filling defect, 

septum, synechia, mass, or bicornuate configuration. 

 Right fallopian tube: Patent with normal spillage of contrast into the 

peritoneum. 

 Left fallopian tube: Patent with normal spillage of contrast into the 

peritoneum. 

Other findings: Using sterile technique, a speculum was placed into the vagina. 

The cervix was then cannulated with sterile technique and Omnipaque-300 was 

instilled into the uterine cavity during fluoroscopy. Fluoroscopic images were 

saved on PACS. Discharge instructions were given with precautions for 

infection. The patient was reported to call if there was any increased pelvic 

pain,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

## 2020-06-05 NOTE — XMS REPORT
Clinical Summary

                             Created on: 2020



Guillermo Garzon

External Reference #: [id number]

: 1990

Sex: Female



Demographics





                          Address                   [address] [place]  [address]

 

                          Home Phone                [phone]

 

                          Preferred Language        Unknown

 

                          Marital Status            Single

 

                          Evangelical Affiliation     CAT

 

                          Race                      Unknown

 

                          Ethnic Group              Unknown





Author





                          Author                    [facility]

ict

 

                          Organization              [facility]

 

                          Address                   Unknown

 

                          Phone                     Unavailable







Support





                Name            Relationship    Address         Phone

 

                    [employer]    [employer]                [address] [place]  [address]                      [phone]







Care Team Providers





                    Care Team Member Name Role                Phone

 

                    Bennie Lopez MD    PCP                 [phone]







Allergies

No Known Allergies



Medications





                          End Date                  Status



              Medication   Sig          Dispensed    Refills      Start  



                                         Date  

 

                                                    Active



                 azithromycin (ZITHROMAX)  250 mg.         0                 



                           250 mg tablet             9  

 

                                                    Active



                 escitalopram (LEXAPRO) 10  10 mg.          0               

7/201  



                           mg tablet                 9  

 

                                                    Active



                 predniSONE (DELTASONE) 10  10 mg.          0               //201  



                           mg tablet                 9  

 

                                                    Active



                     promethazine/dextromethor    0                    

 



                           tanner (PROMETHAZINE-DM)     9  



                                         6.25-15 mg/5 mL syrup      

 

                                                    Active



              naproxen (NAPROSYN) 500  Take 1 tablet  60 tablet    1            

  



                     mg tabletIndications:  by mouth 2          9  



                           Chronic low back pain     times daily     



                           with bilateral sciatica,  as needed for     



                           unspecified back pain     Pain.     



                                         laterality      

 

                                                    Active



              gabapentin (NEURONTIN)  Take 1       90 capsule   1              



                     300 mg              capsule daily       9  



                           capsuleIndications:       for 1 week.     



                           Chronic low back pain     You may     



                           with bilateral sciatica,  increase the     



                           unspecified back pain     dose to 1     



                           laterality                capsule twice     



                                         daily if     



                                         needed..     

 

                                                    Active



              cyclobenzaprine  Take 1 tablet  45 tablet    0            20

1  



                     (FLEXERIL) 10 mg    by mouth 3          9  



                           tabletIndications:        times daily     



                           Chronic right-sided low   as needed for     



                           back pain with            Muscle     



                           right-sided sciatica      Spasms.     

 

                                                    Active



              FLUoxetine (PROZAC) 20 mg  Take 2       60 capsule   2            

10/30/201  



                     capsuleIndications: RAMOS  capsules by         9  



                           (generalized anxiety      mouth daily.     



                                         disorder)      

 

                                                    Active



              propranolol (INDERAL) 10  Take 1-2     180 tablet   2            1

  



                     mg tabletIndications: RAMOS  tablets by          9  



                           (generalized anxiety      mouth up to     



                           disorder)                 three times     



                                         daily as     



                                         needed for     



                                         anxiety.     

 

                          2019                Discontinued (Therapy comple

milka)



              hydrOXYzine (ATARAX) 25  Take 1 tablet  60 tablet    1            

  



                     mg tabletIndications:  by mouth 2          9  



                           Anxiety disorder,         times daily     



                           unspecified type          as needed for     



                                         Anxiety or     



                                         Insomnia.     

 

                          2019                Discontinued (Therapy comple

milka)



              busPIRone (BUSPAR) 5 mg  Take 1 tablet  180 tablet   0            

  



                     tabletIndications: RAMOS  by mouth 2          9  



                           (generalized anxiety      times daily.     



                                         disorder)      

 

                          10/30/2019                Discontinued



              FLUoxetine (PROZAC) 20 mg  Take 1       30 capsule   1            

07/10/201  



                     capsuleIndications: RAMOS  capsule by          9  



                           (generalized anxiety      mouth daily.     



                                         disorder)      

 

                          10/30/2019                Discontinued (Reorder)



              propranolol (INDERAL) 10  Take 1 tablet  90 tablet    1           

 07/10/201  



                     mg tabletIndications: RAMOS  by mouth 3          9  



                           (generalized anxiety      times daily     



                           disorder)                 as needed     



                                         (anxiety).     

 

                          2020                



              FLUoxetine (PROZAC) 10 mg  Take 1       67 capsule   1            

  



                     capsuleIndications:  capsule by          0  



                           Generalized anxiety       mouth daily     



                           disorder                  for 7 days,     



                                         THEN 2     



                                         capsules     



                                         daily for 30     



                                         days.     







Active Problems





 



                           Problem                   Noted Date

 

 



                           RAOMS (generalized anxiety disorder)  2019







Encounters





                          Care Team                 Description



                     Date                Type                Specialty  

 

                                        



Porsha Main ResidentMD         Generalized anxiety disorder (Primary Dx

)



                     2020          Office Visit        Psychiatry  

 

                                        



Christa Hughes MD                  Panic disorder (Primary Dx); 

RAMOS (generalized anxiety disorder)



                     10/30/2019          Office Visit        Psychiatry  

 

                                        



Bennie Lopez MD                       Hyperlipidemia, unspecified hyperlipidem

ia type (Primary Dx); 

Elevated LFTs; 

Chronic right-sided low back pain with right-sided sciatica



                     2019          Office Visit        Family Practice  

 

                                        



Christa Hughes MD                  RAMOS (generalized anxiety disorder) (Prim

jone Dx)



                     07/10/2019          Office Visit        Psychiatry  



after 2019



Family History





   



                 Medical History  Relation        Name            Comments

 

   



                           Arthritis                 Maternal  



                                         Grandmother  

 

   



                           Cancer                    Maternal  



                                         Grandmother  

 

   



                           Arthritis                 Mother  

 

   



                           Psychiatry                Mother  







   



                 Relation        Name            Status          Comments

 

   



                     Brother             Alive               2

 

   



                           Father                    Alive 

 

   



                           Maternal Grandfather      Alive 

 

   



                           Maternal Grandmother      Alive 

 

   



                           Mother                    Alive 

 

   



                           Paternal Grandfather      Alive 

 

   



                           Paternal Grandmother      Alive 

 

   



                     Sister              Alive               3

 

   



                     Son                 Alive               1







Social History





                                        Date



                 Tobacco Use     Types           Packs/Day       Years Used 

 

                                         



                                         Former Smoker    

 

    



                                         Smokeless Tobacco: Never   



                                         Used   







                    Drinks/Week         oz/Week             Comments



                                         Alcohol Use   

 

                                                             



                                         Never   







  



                     Alcohol Habits      Answer              Date Recorded

 

  



                     How often do you have a drink containing alcohol?  Never   

            2019

 

  



                           How many drinks containing alcohol do you have on  No

t asked 



                                         a typical day when you are drinking?  

 

  



                           How often do you have six or more drinks on one  Not 

asked 



                                         occasion?  







 



                           Sex Assigned at Birth     Date Recorded

 

 



                                         Not on file 







                                        Industry



                           Job Start Date            Occupation 

 

                                        Not on file



                           Not on file               Not on file 







                                        Travel End



                           Travel History            Travel Start 

 





                                         No recent travel history available.







Last Filed Vital Signs





                    Reading             Time Taken          Comments



                                         Vital Sign   

 

                    122/81              2020  8:13 AM CST  



                                         Blood Pressure   

 

                    85                  2020  8:13 AM CST  



                                         Pulse   

 

                    36.7 C (98.1 F) 2020  8:13 AM CST  



                                         Temperature   

 

                    20                  2020  8:13 AM CST  



                                         Respiratory Rate   

 

                    98%                 10/30/2019  9:08 AM CDT  



                                         Oxygen Saturation   

 

                    -                   -                    



                                         Inhaled Oxygen   



                                         Concentration   

 

                    62.8 kg (138 lb 6.4 oz) 2020  8:13 AM CST  



                                         Weight   

 

                    154.9 cm (5' 1")    2020  8:13 AM CST  



                                         Height   

 

                    26.15               2020  8:13 AM CST  



                                         Body Mass Index   







Plan of Treatment





   



                 Health Maintenance  Due Date        Last Done       Comments

 

   



                           Cervical Cancer Scrn (3   2013  



                                         Yrs)   

 

   



                           IMM Influenza Seasonal    10/01/2020  



                                         Oct to March (>/= 19 yrs)   







Procedures





                                        Comments



                 Procedure Name  Priority        Date/Time       Associated Diag

nosis 

 

                                        



Results for this procedure are in the results section.



                 HEPATITIS PANEL  Routine         2019      Elevated LFTs 



                                         4:00 PM CDT  



after 2019



Results

* Hepatitis Panel (2019  4:00 PM CDT)



    



              Component    Value        Ref Range    Performed At  Pathologist



                                         Signature

 

    



                 Hep C Vir Ab    Negative        Negative        [facility] 



                           [facility] 

 

    



                 Hep B Surface   Negative        Negative        [facility] 



                           Ag                        [facility] 

 

    



                 Hep A Vir Ab    Negative        Negative        [facility] 



                           [facility]                       [facility] 

 

    



                 Hep B Core Ab   Negative        Negative        [facility] 



                           [facility]                       [facility] 











                                         Specimen

 





                                         Blood







   



                 Performing Organization  Address         City/State/Zipcode  Ph

one Number

 

   



                 [facility]  [address] [place] [address]  [phone]





after 2019



Insurance





                                        Type



            Payer      Benefit    Subscriber ID  Effective  Phone      Address 



                           Plan /                    Dates   



                                         Group     

 

                                         



            [facility]  [employer]  xxxxxxxxx  3/1/2018-P  [id number] [id number]  [address]              [address]              [id number] 



                           [place] 



                                         [address] 

 

                                         



            [employer] MEDICAID O  [employer]  xxxxxxxxx  3/1/2018-P  [id number]

[id number]                [address]              [id number] 



                                         [place] 



                                         [address] 







     



            Guarantor Name  Account    Relation to  Date of    Phone      Maral ocampo Address



                     Type                Patient             Birth  

 

     



            DuranGuillermo Trores  Personal/F  Self       1990  [phone]  [phone] 

[employer] [facility]               (Home)              [place] [address]

 

     



            Guillermo Garzon  Psych      Self       1990  [phone]  [phone] [employer] 

[facility]



                           (Home)                    [place] [address]







Advance Directives





                          Patient Representative    Explanation



                           Type                      Date Recorded  

 

                                                     



                           Advance Directives        10/30/2019  9:05 AM  



                                         and Living Will

## 2020-06-05 NOTE — XMS REPORT
Summary of Care: 17 - 17

                             Created on: 2117



LUZMARIA HARDY

External Reference #: [id number]

: 1990

Sex: Female



Demographics





                          Address                   [address] [place]  [address]-

 

                          Home Phone                [phone]

 

                          Preferred Language        English

 

                          Marital Status            Single

 

                          Confucianist Affiliation     None

 

                          Race                      Other

 

                          Ethnic Group              /Latin





Author





                          Author                    [facility]

ospital

 

                          Organization              [facility]

 

                          Address                   Unknown

 

                          Phone                     Unavailable







Encounter





MELISSA Rizzo(EDGARDO) [id number] Date(s): 17 - 17

[facility] [address] [place] [address]-     [phone]
[phone]

Discharge Diagnosis: Acute upper respiratory infection

Discharge Diagnosis: CAP (community acquired pneumonia)

Discharge Disposition: Home or Self Care

Attending Physician: Randall Lott DO





Vital Signs





  



                     Most recent to      1                   2



                                         oldest [Reference  



                                         Range]:  

 

  



                           Height                    152.4 cm 



                                         (17 3:32 PM) 

 

  



                     Temperature Oral    98 DegF             97.9 DegF



                     [96.4-99.1 DegF]    (17 5:31 PM)    (17 3:32 PM)

 

  



                     Blood Pressure      130/88 mmHg         135/84 mmHg



                     [/60-90 mmHg]  (17 5:31 PM)    (17 3:32 PM)

 

  



                     Respiratory Rate    18 BRMIN            18 BRMIN



                     [14-20 BRMIN]       (17 5:31 PM)    (17 3:32 PM)

 

  



                     Peripheral Pulse    87 bpm              88 bpm



                     Rate [ bpm]   (17 5:31 PM)    (17 3:32 PM)

 

  



                           Weight                    54.545 kg 



                                         (17 3:32 PM) 

 

  



                           Body Mass Index           23.48 m2 



                                         (17 3:32 PM) 







Problem List





No data available for this section



Allergies, Adverse Reactions, Alerts





   



                 Substance       Reaction        Severity        Status

 

   



                           NKDA                      Active







Medications





amoxicillin 500 mg oral tablet

500 mg = 1 tab, PO, BID, X 10 day, # 20 tab, 0 Refill(s)

Start Date: 17

Stop Date: 17

Status: Ordered



Tessalon 200 mg oral capsule

200 mg = 1 cap, PO, TID, X 7 day, # 21 cap, 0 Refill(s)

Start Date: 17

Stop Date: 17

Status: Ordered



Results





No data available for this section



Immunizations





No data available for this section



Procedures





   



                 Procedure       Date            Related Diagnosis  Body Site

 

   



                                          section   







Social History





 



                           Social History Type       Response

 

 



                           Smoking Status            Current some day smoker; Ex

posure to Tobacco Smoke None; 

Cigarette Smoking



                                         Last 365 Days Yes; Reg Smoking Cessatio

n Counseling Yes







Assessment and Plan





No data available for this section

## 2020-06-05 NOTE — XMS REPORT
Summary of Care: 19 - 19

                             Created on: 06/10/2053



LUZMARIA HARDY

External Reference #: [id number]

: 1990

Sex: Female



Demographics





                          Address                   

[place]  [address]

 

                          Home Phone                [phone]

 

                          Preferred Language        English

 

                          Marital Status            Single

 

                          Cheondoism Affiliation     None

 

                          Race                      Other

 

                                        Additional Race(s)  

 

                          Ethnic Group              /Latin





Author





                          Author                    [facility]

ospital

 

                          Organization              [facility]

 

                          Address                   Unknown

 

                          Phone                     Unavailable







Encounter





HQ Matthias(FIN) [id number] Date(s): 19 - 19

[facility] [address] [place] [address]-     [phone]
[phone]

Discharge Disposition: Home or Self Care

Attending Physician: Ronnie White MD

Referring Physician: Ronnie White MD





Vital Signs





No data available for this section



Problem List





No data available for this section



Allergies, Adverse Reactions, Alerts





No Known Medication Allergies



Medications





Omnipaque 300

10 mL, Route: Intrauteral, Dosing Weight 54.545, kg, ONCE, Start date: 19 
12:37:00 CST, Stop date: 19 12:37:00 CST

Start Date: 19

Stop Date: 19

Status: Ordered



Results









 



                           Most recent to            1



                                         oldest [Reference 



                                         Range]: 

 

 



                           hCG Tot                   <1 mIU/mL



                                         *NA*



                                         (19 12:59 PM)







Immunizations





No data available for this section



Procedures





    



              Procedure    Date         Related Diagnosis  Body Site    Status

 

    



                            section          Completed







Social History





 



                           Social History Type       Response

 

 



                           Smoking Status            Current some day smoker; Ex

posure to Tobacco Smoke None; 

Cigarette Smoking



                                         Last 365 Days Yes; Reg Smoking Cessatio

n Counseling Yes



                                         entered on: 17







Assessment and Plan





No data available for this section

## 2020-06-05 NOTE — XMS REPORT
Summary of Care: 10/25/16 - 10/25/16

                             Created on: 2061



LUZMARIA HARDY

External Reference #: [id number]

: 1990

Sex: Female



Demographics





                          Address                   [address]

[place]  [address]-

 

                          Home Phone                [phone]

 

                          Preferred Language        English

 

                          Marital Status            Single

 

                          Zoroastrian Affiliation     None

 

                          Race                      Other

 

                          Ethnic Group              /Latin





Author





                          Author                    [facility]

ospital

 

                          Organization              [facility]

ospi\[facility]\""

 

                          Address                   Unknown

 

                          Phone                     Unavailable







Encounter





MELISSA Rizzo(EDGARDO) [id number] Date(s): 10/25/16 - 10/25/16

[facility] [address] [place] [address]-     [phone]
[phone]

Discharge Diagnosis: Contusion of hand

Discharge Disposition: Home or Self Care

Attending Physician: Darlene Tafoya DO





Vital Signs





  



                     Most recent to      1                   2



                                         oldest [Reference  



                                         Range]:  

 

  



                           Height                    152.4 cm 



                                         (10/25/16 7:48 PM) 

 

  



                     Temperature Oral    97.6 DegF           97.9 DegF



                     [96.4-99.1 DegF]    (10/25/16 9:59 PM)  (10/25/16 7:48 PM)

 

  



                     Blood Pressure      109/61 mmHg         128/73 mmHg



                     [/60-90 mmHg]  (10/25/16 9:59 PM)  (10/25/16 7:48 PM)

 

  



                     Respiratory Rate    16 BRMIN            18 BRMIN



                     [14-20 BRMIN]       (10/25/16 9:59 PM)  (10/25/16 7:48 PM)

 

  



                     Peripheral Pulse    72 bpm              75 bpm



                     Rate [ bpm]   (10/25/16 9:59 PM)  (10/25/16 7:48 PM)

 

  



                           Weight                    56.818 kg 



                                         (10/25/16 7:48 PM) 

 

  



                           Body Mass Index           24.46 m2 



                                         (10/25/16 7:48 PM) 







Problem List





No data available for this section



Allergies, Adverse Reactions, Alerts





   



                 Substance       Reaction        Severity        Status

 

   



                           NKDA                      Active







Medications





Ultram 50 mg oral tablet

50 mg = 1 tab, PO, Q4H, PRN pain, X 5 day, # 30 tab, 0 Refill(s)

Start Date: 10/25/16

Stop Date: 10/30/16

Status: Ordered



Results





No data available for this section



Immunizations





No data available for this section



Procedures





   



                 Procedure       Date            Related Diagnosis  Body Site

 

   



                                          section   







Social History





 



                           Social History Type       Response

 

 



                           Smoking Status            Current some day smoker; Ex

posure to Tobacco Smoke None; 

Cigarette Smoking



                                         Last 365 Days Yes; Reg Smoking Cessatio

n Counseling Yes







Assessment and Plan





No data available for this section

## 2020-06-05 NOTE — DIAGNOSTIC IMAGING REPORT
CT of the abdomen and pelvis.



Comparison: None]



Clinical History:  Right upper quadrant pain



Technique: Helical CT scan of the abdomen and pelvis was performed. 

Intravenous contrast administration was utilized.  Oral contrast administration

was not utilized. Coronal and sagittal reconstructions were generated from the

raw data.  Multiple images were submitted for interpretation.



This exam was performed according to our departmental dose-optimization program

which includes automated exposure control, adjustment of the mA and/or kV

according to patient size 



Discussion:



Inferior chest: Unremarkable. 

Liver:  Diffuse steatosis suspected. Otherwise unremarkable.

Spleen:  Unremarkable

Pancreas:  Unremarkable

Biliary tree and gallbladder:  Unremarkable

Adrenal glands:  Unremarkable

Kidneys and ureters:  Unremarkable

Vasculature:  Unremarkable

Lymph nodes:  Unremarkable

Bowel:  Unremarkable. Dense oval objects in the distal ileum could represent

early ingested material.

Pelvis: Urinary bladder is unremarkable. Uterus unremarkable. A right ovarian

cyst measuring approximately 4.7 x 4.3 cm. A left ovarian cyst measuring

approximately 2.9 x 2.1 cm. This should be further evaluated with a follow-up

female pelvic ultrasound in approximately 6 weeks. Pelvic wall unremarkable.

Peritoneum: Unremarkable

Perineal compartments: unremarkable.

Fluid:  No free fluid

Bones:  Unremarkable

Body wall: Unremarkable



Impression:

Bilateral pelvic cysts which could represent functional cysts in a woman of

childbearing age. Follow-up with pelvic ultrasound in approximately 6 weeks is

recommended.



Dense pill-like objects in the distal ileum. This could represent orally

ingested radiodense material. Clinical correlation is recommended.



Possible hepatic steatosis.



No other significant acute abnormalities seen.



Signed by: Willian Ceballos MD on 6/5/2020 1:05 PM

## 2020-06-12 NOTE — DIAGNOSTIC IMAGING REPORT
Hepatobiliary Scan with Gallbladder Ejection Fraction



Clinical information: Upper abdominal pain with nausea



Report: Following intravenous administration of 6.1 millicuries of Tc-99m

mebrofenin, dynamic images of the abdomen in the anterior projection were

obtained through 60 minutes.  Sincalide (CCK analog) 1.3 micrograms was

administered intravenously over 30 minutes with additional imaging for

determination of gallbladder ejection fraction.



Perfusion to the liver is normal.  Extraction of tracer from the blood pool by

the liver parenchyma is normal.  Tracer is seen promptly within the biliary

tract.  The gallbladder begins to fill by 8 minutes post-injection of tracer

and fills adequately.  Tracer is seen in the small bowel by 52 minutes.  The

gallbladder ejection fraction with administration of sincalide is 86% (normal

greater than 40%).



Impression: 



1.  Filling of the gallbladder excludes the diagnosis of acute cystic duct

obstruction/acute cholecystitis.

2.  Normal gallbladder ejection fraction of 86% does not support the clinical

diagnosis of chronic cholecystitis/gallbladder dyskinesia.



Signed by: Dr. Emperatriz Espinal M.D. on 6/12/2020 5:41 PM

## 2023-02-11 ENCOUNTER — HOSPITAL ENCOUNTER (EMERGENCY)
Dept: HOSPITAL 88 - ER | Age: 33
Discharge: HOME | End: 2023-02-11
Payer: COMMERCIAL

## 2023-02-11 VITALS — WEIGHT: 120 LBS | BODY MASS INDEX: 22.66 KG/M2 | HEIGHT: 61 IN

## 2023-02-11 VITALS — SYSTOLIC BLOOD PRESSURE: 139 MMHG | DIASTOLIC BLOOD PRESSURE: 84 MMHG

## 2023-02-11 DIAGNOSIS — F17.210: ICD-10-CM

## 2023-02-11 DIAGNOSIS — L02.01: Primary | ICD-10-CM

## 2023-02-11 PROCEDURE — 99283 EMERGENCY DEPT VISIT LOW MDM: CPT
